# Patient Record
Sex: MALE | Race: WHITE | Employment: UNEMPLOYED | URBAN - METROPOLITAN AREA
[De-identification: names, ages, dates, MRNs, and addresses within clinical notes are randomized per-mention and may not be internally consistent; named-entity substitution may affect disease eponyms.]

---

## 2017-09-07 ENCOUNTER — HOSPITAL ENCOUNTER (EMERGENCY)
Facility: HOSPITAL | Age: 32
Discharge: HOME | End: 2017-09-07
Attending: EMERGENCY MEDICINE
Payer: MEDICAID

## 2017-09-07 VITALS
OXYGEN SATURATION: 98 % | WEIGHT: 175 LBS | SYSTOLIC BLOOD PRESSURE: 141 MMHG | HEART RATE: 91 BPM | RESPIRATION RATE: 16 BRPM | HEIGHT: 69 IN | BODY MASS INDEX: 25.92 KG/M2 | DIASTOLIC BLOOD PRESSURE: 88 MMHG | TEMPERATURE: 98 F

## 2017-09-07 DIAGNOSIS — L50.9 URTICARIA: Primary | ICD-10-CM

## 2017-09-07 PROCEDURE — 99283 EMERGENCY DEPT VISIT LOW MDM: CPT

## 2017-09-07 RX ORDER — PREDNISONE 20 MG/1
60 TABLET ORAL ONCE
Status: COMPLETED | OUTPATIENT
Start: 2017-09-07 | End: 2017-09-07

## 2017-09-07 RX ORDER — PREDNISONE 20 MG/1
40 TABLET ORAL DAILY
Qty: 10 TABLET | Refills: 0 | Status: SHIPPED | OUTPATIENT
Start: 2017-09-07 | End: 2017-09-12

## 2017-09-07 RX ORDER — DIPHENHYDRAMINE HCL 25 MG
25 TABLET ORAL EVERY 6 HOURS PRN
Qty: 30 TABLET | Refills: 0 | Status: SHIPPED | OUTPATIENT
Start: 2017-09-07

## 2017-09-07 RX ORDER — DIPHENHYDRAMINE HCL 25 MG
25 CAPSULE ORAL ONCE
Status: COMPLETED | OUTPATIENT
Start: 2017-09-07 | End: 2017-09-07

## 2017-09-07 NOTE — ED PROVIDER NOTES
Patient Seen in: [facility] AND [facility] Emergency Department    History   Patient presents with:  Rash Skin Problem (integumentary)    Stated Complaint: rash    HPI    26-year-old male presents for complaint of a rash. Rash began yesterday.   Rash is red, itc BMI 25.84 kg/m²         Physical Exam   Constitutional: He is oriented to person, place, and time. He appears well-developed and well-nourished. HENT:   Head: Normocephalic and atraumatic.    Mouth/Throat: Uvula is midline, oropharynx is clear and moist a Rubin James MD  [facility] [id number] [facility]    Schedule an appointment as soon as possible for a visit in 2 days        Medications Prescribed:  Current Discharge Medication List    START taking these medications    pre

## 2020-01-26 ENCOUNTER — HOSPITAL (OUTPATIENT)
Dept: OTHER | Age: 35
End: 2020-01-26

## (undated) NOTE — LETTER
September 7, 2017    Patient: Freeman Desai   Date of Visit: 9/7/2017       To Whom It May Concern:    Noel Cruz was seen and treated in our emergency department on 9/7/2017. He can return to work 9/8/17.     If you have any questions or concerns, p

## (undated) NOTE — ED AVS SNAPSHOT
Freeman Desai   MRN: [id number]    Department:  [facility] Emergency Department   Date of Visit:  9/7/2017           Disclosure     Insurance plans vary and the physician(s) referred by the ER may not be covered by your plan.  Please contact yo CARE PHYSICIAN AT ONCE OR RETURN IMMEDIATELY TO THE EMERGENCY DEPARTMENT. If you have been prescribed any medication(s), please fill your prescription right away and begin taking the medication(s) as directed.   If you believe that any of the medications